# Patient Record
Sex: FEMALE | Race: WHITE | NOT HISPANIC OR LATINO | URBAN - NONMETROPOLITAN AREA
[De-identification: names, ages, dates, MRNs, and addresses within clinical notes are randomized per-mention and may not be internally consistent; named-entity substitution may affect disease eponyms.]

---

## 2019-02-20 ENCOUNTER — IMPORTED ENCOUNTER (OUTPATIENT)
Dept: URBAN - NONMETROPOLITAN AREA CLINIC 1 | Facility: CLINIC | Age: 24
End: 2019-02-20

## 2019-02-20 PROBLEM — H52.13: Noted: 2019-02-20

## 2019-02-20 PROCEDURE — S0621 ROUTINE OPHTHALMOLOGICAL EXA: HCPCS

## 2019-02-20 NOTE — PATIENT DISCUSSION
"DVO rx given. No change. Wears prn. ""Myopia-Discussed diagnosis with patient. -Explained that people who are myopic are at a higher risk for developing RD/RT and reviewed associated S&S.-Pt to contact our office if symptoms develop.; 's Notes: Won the [employer] [address] last year with her Dad. Also shows lambs. Going to [facility]. Now [facility].  @ [employer]"

## 2020-02-25 ENCOUNTER — IMPORTED ENCOUNTER (OUTPATIENT)
Dept: URBAN - NONMETROPOLITAN AREA CLINIC 1 | Facility: CLINIC | Age: 25
End: 2020-02-25

## 2020-02-25 PROCEDURE — S0621 ROUTINE OPHTHALMOLOGICAL EXA: HCPCS

## 2020-02-25 NOTE — PATIENT DISCUSSION
Myopia-Discussed diagnosis with patient. -Explained that people who are myopic are at a higher risk for developing RD/RT and reviewed associated S&S.-Pt to contact our office if symptoms develop.; 's Notes: Won the [employer] [address] last year with her Dad. Also shows lambs. Going to [facility]. Now [facility].  @ [employer]

## 2022-04-15 ASSESSMENT — VISUAL ACUITY
OS_CC: 20/20
OD_CC: 20/40
OD_SC: J1
OD_CC: 20/40
OD_PH: 20/25
OS_SC: J1
OS_CC: 20/20
OU_SC: J1+

## 2022-04-15 ASSESSMENT — TONOMETRY
OD_IOP_MMHG: 14
OD_IOP_MMHG: 15
OS_IOP_MMHG: 15
OS_IOP_MMHG: 14

## 2024-09-17 ENCOUNTER — NEW PATIENT (OUTPATIENT)
Dept: RURAL CLINIC 1 | Facility: CLINIC | Age: 29
End: 2024-09-17

## 2024-09-17 DIAGNOSIS — H52.13: ICD-10-CM

## 2024-09-17 PROCEDURE — S0620AEC ROUTINE OPH EXAM INCLUDES REF/ NEW PATIENT
